# Patient Record
Sex: FEMALE | Race: WHITE | NOT HISPANIC OR LATINO | Employment: UNEMPLOYED | URBAN - METROPOLITAN AREA
[De-identification: names, ages, dates, MRNs, and addresses within clinical notes are randomized per-mention and may not be internally consistent; named-entity substitution may affect disease eponyms.]

---

## 2022-04-19 ENCOUNTER — TELEPHONE (OUTPATIENT)
Dept: UROLOGY | Facility: CLINIC | Age: 32
End: 2022-04-19
Payer: COMMERCIAL

## 2022-04-19 NOTE — TELEPHONE ENCOUNTER
[facility] Call Center    Phone Message    May a detailed message be left on voicemail: yes     Reason for Call: Other: Pt called and stated they would like a consult w/ provider for gender confirmation surgery, pt states they have a therapist and psychiatrist that are willing to sign off on this. Please call pt back to further discuss, Thanks!    Action Taken: Message routed to:  [facility] ([facility] Uro    Travel Screening: Not Applicable

## 2022-04-20 ENCOUNTER — TELEPHONE (OUTPATIENT)
Dept: PLASTIC SURGERY | Facility: CLINIC | Age: 32
End: 2022-04-20

## 2022-04-20 ENCOUNTER — OFFICE VISIT (OUTPATIENT)
Dept: DERMATOLOGY | Facility: CLINIC | Age: 32
End: 2022-04-20
Payer: COMMERCIAL

## 2022-04-20 DIAGNOSIS — F64.9 GENDER DYSPHORIA: Primary | ICD-10-CM

## 2022-04-20 PROBLEM — F41.9 ANXIETY: Status: ACTIVE | Noted: 2022-04-20

## 2022-04-20 PROBLEM — G47.33 MILD OBSTRUCTIVE SLEEP APNEA: Status: ACTIVE | Noted: 2021-05-06

## 2022-04-20 PROBLEM — F32.A DEPRESSION: Status: ACTIVE | Noted: 2022-04-20

## 2022-04-20 PROBLEM — F43.10 POSTTRAUMATIC STRESS DISORDER: Status: ACTIVE | Noted: 2019-12-03

## 2022-04-20 PROBLEM — J45.20 MILD INTERMITTENT ASTHMA WITHOUT COMPLICATION: Status: ACTIVE | Noted: 2019-05-20

## 2022-04-20 PROBLEM — F31.32 BIPOLAR AFFECTIVE DISORDER, CURRENTLY DEPRESSED, MODERATE (H): Status: ACTIVE | Noted: 2021-02-05

## 2022-04-20 PROBLEM — R45.851 SUICIDAL IDEATION: Status: ACTIVE | Noted: 2020-09-22

## 2022-04-20 PROCEDURE — 99214 OFFICE O/P EST MOD 30 MIN: CPT | Performed by: DERMATOLOGY

## 2022-04-20 RX ORDER — TEMAZEPAM 15 MG/1
15 CAPSULE ORAL
COMMUNITY
Start: 2021-09-15

## 2022-04-20 RX ORDER — MOMETASONE FUROATE 1 MG/G
CREAM TOPICAL
COMMUNITY
Start: 2022-03-07

## 2022-04-20 RX ORDER — TOPIRAMATE 50 MG/1
TABLET, FILM COATED ORAL
COMMUNITY
Start: 2022-03-29

## 2022-04-20 RX ORDER — LAMOTRIGINE 200 MG/1
TABLET ORAL
COMMUNITY
Start: 2022-03-27

## 2022-04-20 RX ORDER — FLUTICASONE PROPIONATE 50 MCG
2 SPRAY, SUSPENSION (ML) NASAL
COMMUNITY
Start: 2020-09-22

## 2022-04-20 RX ORDER — CLINDAMYCIN AND BENZOYL PEROXIDE 10; 50 MG/G; MG/G
GEL TOPICAL
COMMUNITY
Start: 2022-01-30

## 2022-04-20 RX ORDER — BUPROPION HYDROCHLORIDE 150 MG/1
300 TABLET ORAL DAILY
COMMUNITY
Start: 2021-10-05

## 2022-04-20 RX ORDER — QUETIAPINE FUMARATE 25 MG/1
TABLET, FILM COATED ORAL
COMMUNITY
Start: 2021-10-09

## 2022-04-20 RX ORDER — NEEDLES, DISPOSABLE 25GX5/8"
NEEDLE, DISPOSABLE MISCELLANEOUS
COMMUNITY
Start: 2022-04-15

## 2022-04-20 RX ORDER — ACETAMINOPHEN 160 MG
1 TABLET,DISINTEGRATING ORAL DAILY
COMMUNITY
Start: 2021-08-11

## 2022-04-20 RX ORDER — CETIRIZINE HYDROCHLORIDE 10 MG/1
TABLET ORAL
COMMUNITY
Start: 2022-04-19

## 2022-04-20 RX ORDER — PROGESTERONE 100 MG/1
100 CAPSULE ORAL DAILY
COMMUNITY
Start: 2022-02-25

## 2022-04-20 RX ORDER — TRETINOIN 0.5 MG/G
CREAM TOPICAL
COMMUNITY
Start: 2021-04-05

## 2022-04-20 RX ORDER — SPIRONOLACTONE 100 MG/1
100 TABLET, FILM COATED ORAL 2 TIMES DAILY
COMMUNITY
Start: 2022-02-18

## 2022-04-20 RX ORDER — SYRINGE, DISPOSABLE, 1 ML
SYRINGE, EMPTY DISPOSABLE MISCELLANEOUS
COMMUNITY
Start: 2022-04-15

## 2022-04-20 RX ORDER — ESTRADIOL CYPIONATE 5 MG/ML
INJECTION INTRAMUSCULAR
COMMUNITY
Start: 2022-03-20

## 2022-04-20 RX ORDER — ALPRAZOLAM 0.5 MG
TABLET ORAL
COMMUNITY
Start: 2022-02-04

## 2022-04-20 RX ORDER — ATOMOXETINE 40 MG/1
40 CAPSULE ORAL DAILY
COMMUNITY
Start: 2022-03-24

## 2022-04-20 RX ORDER — ALBUTEROL SULFATE 90 UG/1
AEROSOL, METERED RESPIRATORY (INHALATION)
COMMUNITY
Start: 2021-06-10

## 2022-04-20 RX ORDER — DESVENLAFAXINE 25 MG/1
TABLET, EXTENDED RELEASE ORAL
COMMUNITY
Start: 2022-04-08 | End: 2022-06-07

## 2022-04-20 NOTE — PROGRESS NOTES
[facility] Dermatology Note      Dermatology Problem List:  1.Acquired keratoderma  - urea  2. Verruca  3. Keratosis pilaris  4. Eczema     Encounter Date: Apr 20, 2022    CC:   Chief Complaint   Patient presents with     Derm Problem     Jocelynn is here for a consult for laser removal for face and genital area         History of Present Illness:  Ms. Jocelynn Carson is a 31 year old adult who {presents for evaluation of laser hair removal for face and genitals. The patient was referred by Lyly Chapa MD. The patient would like laser hair removal.     Dermatology Safety Transgender Patient Intake:  Is the patient pending upcoming gender affirmation surgery: No  IF yes, what type of surgery is the upcoming expected affirmation surgery(phalloplasty/vaginoplasty/facial surgery/NA)? N/A  What is the goal date of the upcoming expected affirmation surgery (date/ NA)? N/A  Is the patient on hormonal therapy(if yes list what type (estrogen/george/estradiol or testosoterone) and length of time treated):  Yes, estradiol, progesterone, spironolactone  Is the patient here for laser hair removal assessment:  Yes  Has the patient had laser hair removal in the past?  Yes, at home IPL hair removal  If, so, how many approximate weeks/months/years prior: More than 20 treatments      Has the patient ever had any other minimally invasive cosmetic procedures such as the following:  Electrolysis:  No  Botulinum toxin:  No  Fillers (list filler type and location):  No  Chemical peels: No  Lasers: No  Intense pulsed light: No  Broad band light: No  Microneedling: No  Has the patient every had any unknown injectable products or other injectable products? (If yes, please list details if available):  If any minimally invasive  cosmetic procedures performed prior to presentation to our clinic were they performed by a vicky DÍAZ/ NP/RN/ other or tyran:N/A    Has the patient had any prior surgeries as follows:  Breast  "augmentation:  No  Breast removal:  No  Genital:  No  Face:  No  Hair transplant:  No    Was insurance coverage pursued for laser hair removal procedure (Yes/no/pending)?  Does not know  Was insurance coverage obtained (yes/no/pending)?  Does not know    Does the patient have any other concerns regarding the following they would like to address at future appointments:  Skin tone/texture:  No  Rhytides:  No  Scars:  No  Hx of skin cancer:No  History of keloids?:No  History of hypertrophic scars?:No  Other:  No  Does patient have acne concerns: No      Other notes: History of eczema.         Past Medical History:   There is no problem list on file for this patient.  Gender dysphoria  Denies other medical issues  No past medical history on file.  No past surgical history on file.     Medications:  Current Outpatient Medications   Medication Sig Dispense Refill     albuterol (PROAIR HFA/PROVENTIL HFA/VENTOLIN HFA) 108 (90 Base) MCG/ACT inhaler INHALE 2 PUFFS BY MOUTH EVERY 4 HOURS AS NEEDED FOR WHEEZING       ALPRAZolam (XANAX) 0.5 MG tablet        atomoxetine (STRATTERA) 40 MG capsule Take 40 mg by mouth daily       B-D SYRINGE LUER-JESSI 1 ML MISC USE FOR WEEKLY ADMINISTRATIO OF MEDICATION       BD DISP NEEDLE 23G X 1\" MISC USE FOR WEEKLY ADMINISTRATION OF MEDICATION       BD DISP NEEDLES 18G X 1-1/2\" MISC USE FOR WEEKLY ADMINISTRATION OF MEDICATION       benzocaine (ORAJEL MAXIMUM STRENGTH) 20 % GEL gel        buPROPion (WELLBUTRIN XL) 150 MG 24 hr tablet Take 300 mg by mouth daily       cetirizine (ZYRTEC) 10 MG tablet        Cholecalciferol (VITAMIN D3) 50 MCG (2000 UT) CAPS Take 1 capsule by mouth daily       clindamycin-benzoyl peroxide (BENZACLIN) 1-5 % external gel APPLY TOPICALLY TO THE AFFECTED AREA DAILY       desvenlafaxine succinate (PRISTIQ) 25 MG 24 hr tablet        estradiol cypionate (DEPO-ESTRADIOL) 5 MG/ML injection ADMINISTER 0.8 ML IN THE MUSCLE 1 TIME A WEEK       fluticasone (FLONASE) 50 MCG/ACT " nasal spray 2 sprays       lamoTRIgine (LAMICTAL) 200 MG tablet        mometasone (ELOCON) 0.1 % external cream APPLY TWICE DAILY TO ITCHING FOOT. WRAP WITH SARAN WRAP AT NIGHT FOR 10 NIGHTS THEN JUST SUNDAY.       mometasone-formoterol (DULERA) 100-5 MCG/ACT inhaler Inhale 2 puffs into the lungs       progesterone (PROMETRIUM) 100 MG capsule Take 100 mg by mouth daily       QUEtiapine (SEROQUEL) 25 MG tablet TAKE ONE-HALF TO 1 TABLET BY MOUTH AT BEDTIME AS NEEDED FOR INSOMNIA WITH RUNNING THOUGHTS       sertraline (ZOLOFT) 50 MG tablet        spironolactone (ALDACTONE) 100 MG tablet Take 100 mg by mouth 2 times daily       temazepam (RESTORIL) 15 MG capsule Take 15 mg by mouth       topiramate (TOPAMAX) 50 MG tablet        tretinoin (RETIN-A) 0.05 % external cream Apply a thin layer topically once daily to areas of acne on skin.          No Known Allergies      Review of Systems:  -As per HPI  -Constitutional: Otherwise feeling well today, in usual state of health.  -HEENT: Patient denies nonhealing oral sores.  -Skin: As above in HPI. No additional skin concerns.    Physical exam:  Vitals: There were no vitals taken for this visit.  GEN: This is a well developed, well-nourished female in no acute distress, in a pleasant mood.      SKIN: Focused examination of the face and genitals was performed. Medical student acted as chaperone, Dio De La Fuente Dsouza skin type: II  - Terminal hairs on R jaw line, chin and neck  - Terminal hairs in genital area. No wounds or skin lesions.   -No other lesions of concern on areas examined.     Impression/Plan:  1. Gender dysphoria, pending vaginoplasty, plan for laser hair removal which is medically necessary    Reviewed each treatment will target existing hair bulbs, but to treat all hair multiple treatments will be needed. Patient informed laser hair removal is not permenant and hair may regrow years later. Reviewed this will not work for blond or white hair. Any blonde  or white hair will need electrolysis. Recommend the 755 laser Reviewed that home laser units may not be as efficacious.     Location: Neck, perioral, and face. Area planned for treatment and reviewed with patient is  or vaginoplasty(penis, scrotum, perineum  . The patient understands removing hair may result in visible scars      Dsouza skin type II.     We will initiate a letter to insurance to check for insurance coverage by  messaging the [employer].     We have confirmed the patient is not on minoxidil    Shaving hair prior to LHRwill improve efficacy. Any plucking, waxing, electrolysis, or needs to be avoided at least 4 weeks before LHR. Adhere to strict sun avoidance for a minimum of 6 weeks before and after each treatment.     We reviewed with the patient that even with laser hair removal there is still risk for hair growth within the neovaginal cavity for vaginoplasty and is medically recommended.  For phalloplasty, reviewed  hair growth within the neourethra resulting in trapping mostuire, skin products, build of of stones or urine flow for phalloplasty. This is medically necessary.         After treatment we recommend the patient wait 3 months after the last planned hair removal treatment before proceeding with surgery, in and effort to confirm that no further hair regrowth will occur.        Electrolysis was offered as an alternative. We would need to refer outside our sytem.         Other notes for laser hair removal team:   Patient would like to prioritize face/neck but also needs genital hair removal.         Notes from laser intake nursing checklist: Patient would like to prioritize face/neck but also needs genital hair removal.         The patient will send a [employer] message in 2 weeks to check status of coverage.     The patient would like a total skin exam scheduled.       CC Lyly Chapa MD  [address] [place] [address] on close of this encounter.    Follow-up in 2 weeks  by my chart. Also, see Dr. Tinsley for skin exam.        staffed the patient.    Staff Involved:  Scribe/Staff    Scribe Disclosure:  I, Winter Jeter, am serving as a scribe to document services personally performed by Brit Garcia MD based on data collection and the provider's statements to me.     Provider Disclosure:   The documentation recorded by the scribe accurately reflects the services I personally performed and the decisions made by me.    Brit Garcia MD    Department of Dermatology  [facility]: Phone: [phone], Fax:[phone]  [facility]: Phone: [phone], Fax: [phone]

## 2022-04-20 NOTE — TELEPHONE ENCOUNTER
Writer called pt regarding vaginoplasty consultation scheduling, discussed waitlist, added pt to waitlist.     Marisa Gamboa

## 2022-04-20 NOTE — Clinical Note
Haider schedule skin screening with Dr Tinsley in gender care slot. No for hair removal consult. Jose did this

## 2022-04-20 NOTE — LETTER
4/20/2022       RE: Jocelynn Carson  [address]  [address]  [place] [address]     Dear Colleague,    Thank you for referring your patient, Jocelynn Carson, to the [facility] [place] [facility]. Please see a copy of my visit note below.    [facility] Dermatology Note      Dermatology Problem List:  1.Acquired keratoderma  - urea  2. Verruca  3. Keratosis pilaris  4. Eczema     Encounter Date: Apr 20, 2022    CC:   Chief Complaint   Patient presents with     Derm Problem     Jocelynn is here for a consult for laser removal for face and genital area         History of Present Illness:  Ms. Jocelynn Carson is a 31 year old adult who {presents for evaluation of laser hair removal for face and genitals. The patient was referred by Lyly Chapa MD. The patient would like laser hair removal.     Dermatology Safety Transgender Patient Intake:  Is the patient pending upcoming gender affirmation surgery: No  IF yes, what type of surgery is the upcoming expected affirmation surgery(phalloplasty/vaginoplasty/facial surgery/NA)? N/A  What is the goal date of the upcoming expected affirmation surgery (date/ NA)? N/A  Is the patient on hormonal therapy(if yes list what type (estrogen/george/estradiol or testosoterone) and length of time treated):  Yes, estradiol, progesterone, spironolactone  Is the patient here for laser hair removal assessment:  Yes  Has the patient had laser hair removal in the past?  Yes, at home IPL hair removal  If, so, how many approximate weeks/months/years prior: More than 20 treatments      Has the patient ever had any other minimally invasive cosmetic procedures such as the following:  Electrolysis:  No  Botulinum toxin:  No  Fillers (list filler type and location):  No  Chemical peels: No  Lasers: No  Intense pulsed light: No  Broad band light: No  Microneedling: No  Has the patient every had any unknown  "injectable products or other injectable products? (If yes, please list details if available):  If any minimally invasive  cosmetic procedures performed prior to presentation to our clinic were they performed by a vicky DÍAZ/ NP/RN/ other or rory:N/A    Has the patient had any prior surgeries as follows:  Breast augmentation:  No  Breast removal:  No  Genital:  No  Face:  No  Hair transplant:  No    Was insurance coverage pursued for laser hair removal procedure (Yes/no/pending)?  Does not know  Was insurance coverage obtained (yes/no/pending)?  Does not know    Does the patient have any other concerns regarding the following they would like to address at future appointments:  Skin tone/texture:  No  Rhytides:  No  Scars:  No  Hx of skin cancer:No  History of keloids?:No  History of hypertrophic scars?:No  Other:  No  Does patient have acne concerns: No      Other notes: History of eczema.         Past Medical History:   There is no problem list on file for this patient.  Gender dysphoria  Denies other medical issues  No past medical history on file.  No past surgical history on file.     Medications:  Current Outpatient Medications   Medication Sig Dispense Refill     albuterol (PROAIR HFA/PROVENTIL HFA/VENTOLIN HFA) 108 (90 Base) MCG/ACT inhaler INHALE 2 PUFFS BY MOUTH EVERY 4 HOURS AS NEEDED FOR WHEEZING       ALPRAZolam (XANAX) 0.5 MG tablet        atomoxetine (STRATTERA) 40 MG capsule Take 40 mg by mouth daily       B-D SYRINGE LUER-JESSI 1 ML MISC USE FOR WEEKLY ADMINISTRATIO OF MEDICATION       BD DISP NEEDLE 23G X 1\" MISC USE FOR WEEKLY ADMINISTRATION OF MEDICATION       BD DISP NEEDLES 18G X 1-1/2\" MISC USE FOR WEEKLY ADMINISTRATION OF MEDICATION       benzocaine (ORAJEL MAXIMUM STRENGTH) 20 % GEL gel        buPROPion (WELLBUTRIN XL) 150 MG 24 hr tablet Take 300 mg by mouth daily       cetirizine (ZYRTEC) 10 MG tablet        Cholecalciferol (VITAMIN D3) 50 MCG (2000 UT) CAPS Take 1 capsule by mouth daily   "     clindamycin-benzoyl peroxide (BENZACLIN) 1-5 % external gel APPLY TOPICALLY TO THE AFFECTED AREA DAILY       desvenlafaxine succinate (PRISTIQ) 25 MG 24 hr tablet        estradiol cypionate (DEPO-ESTRADIOL) 5 MG/ML injection ADMINISTER 0.8 ML IN THE MUSCLE 1 TIME A WEEK       fluticasone (FLONASE) 50 MCG/ACT nasal spray 2 sprays       lamoTRIgine (LAMICTAL) 200 MG tablet        mometasone (ELOCON) 0.1 % external cream APPLY TWICE DAILY TO ITCHING FOOT. WRAP WITH SARAN WRAP AT NIGHT FOR 10 NIGHTS THEN JUST SUNDAY.       mometasone-formoterol (DULERA) 100-5 MCG/ACT inhaler Inhale 2 puffs into the lungs       progesterone (PROMETRIUM) 100 MG capsule Take 100 mg by mouth daily       QUEtiapine (SEROQUEL) 25 MG tablet TAKE ONE-HALF TO 1 TABLET BY MOUTH AT BEDTIME AS NEEDED FOR INSOMNIA WITH RUNNING THOUGHTS       sertraline (ZOLOFT) 50 MG tablet        spironolactone (ALDACTONE) 100 MG tablet Take 100 mg by mouth 2 times daily       temazepam (RESTORIL) 15 MG capsule Take 15 mg by mouth       topiramate (TOPAMAX) 50 MG tablet        tretinoin (RETIN-A) 0.05 % external cream Apply a thin layer topically once daily to areas of acne on skin.          No Known Allergies      Review of Systems:  -As per HPI  -Constitutional: Otherwise feeling well today, in usual state of health.  -HEENT: Patient denies nonhealing oral sores.  -Skin: As above in HPI. No additional skin concerns.    Physical exam:  Vitals: There were no vitals taken for this visit.  GEN: This is a well developed, well-nourished female in no acute distress, in a pleasant mood.      SKIN: Focused examination of the face and genitals was performed. Medical student acted as chaperone, Dio Ku  - Dsouza skin type: II  - Terminal hairs on R jaw line, chin and neck  - Terminal hairs in genital area. No wounds or skin lesions.   -No other lesions of concern on areas examined.     Impression/Plan:  1. Gender dysphoria, pending vaginoplasty, plan for  laser hair removal which is medically necessary    Reviewed each treatment will target existing hair bulbs, but to treat all hair multiple treatments will be needed. Patient informed laser hair removal is not permenant and hair may regrow years later. Reviewed this will not work for blond or white hair. Any blonde or white hair will need electrolysis. Recommend the 755 laser Reviewed that home laser units may not be as efficacious.     Location: Neck, perioral, and face. Area planned for treatment and reviewed with patient is  or vaginoplasty(penis, scrotum, perineum  . The patient understands removing hair may result in visible scars      Dsouza skin type II.     We will initiate a letter to insurance to check for insurance coverage by  messaging the [employer].     We have confirmed the patient is not on minoxidil    Shaving hair prior to LHRwill improve efficacy. Any plucking, waxing, electrolysis, or needs to be avoided at least 4 weeks before LHR. Adhere to strict sun avoidance for a minimum of 6 weeks before and after each treatment.     We reviewed with the patient that even with laser hair removal there is still risk for hair growth within the neovaginal cavity for vaginoplasty and is medically recommended.  For phalloplasty, reviewed  hair growth within the neourethra resulting in trapping mostuire, skin products, build of of stones or urine flow for phalloplasty. This is medically necessary.         After treatment we recommend the patient wait 3 months after the last planned hair removal treatment before proceeding with surgery, in and effort to confirm that no further hair regrowth will occur.        Electrolysis was offered as an alternative. We would need to refer outside our sytem.         Other notes for laser hair removal team:   Patient would like to prioritize face/neck but also needs genital hair removal.         Notes from laser intake nursing checklist: Patient would like to prioritize  face/neck but also needs genital hair removal.         The patient will send a [employer] message in 2 weeks to check status of coverage.     The patient would like a total skin exam scheduled.       CC Lyly Chapa MD  [address] [place] [address] on close of this encounter.    Follow-up in 2 weeks by my chart. Also, see Dr. Tinsley for skin exam.        staffed the patient.    Staff Involved:  Scribe/Staff    Scribe Disclosure:  I, Winter Jeter, am serving as a scribe to document services personally performed by Brit Garcia MD based on data collection and the provider's statements to me.     Provider Disclosure:   The documentation recorded by the scribe accurately reflects the services I personally performed and the decisions made by me.    Brit Garcia MD    Department of Dermatology  [facility]: Phone: [phone], Fax:[phone]  [facility]: Phone: [phone], Fax: [phone]        Dermatology Laser Intake Checklist:  History of psoriasis: No  History of recent tan, indoor or outdoor tanning/vacation or other sun exposure: No  History of vitiligo: No  Family history of vitiligo: No  Recent other cosmetic procedure(microderm abrasion/peel/hair removal/facial etc): No  History of HSV: Yes, rarely  Did the patient start valtrex: N/A  For genital laser hair removal patient only: Is there a history of genital warts or condyloma: No  Tattoo in the area to be treated: No  Is patient using hydroquinone: No  Retinoids and other acne medications stopped for 2 weeks: No  Has the patient had accutane in the last 6-12 months: No  Pregnant or breastfeeding: No  History of skin cancer in area planned for treatment: No  History of treatment with gold: No  Changes in medical history: N/A  Photos obtained: No  Does the patient smoke: No  Is the patient on  ibuprofen/aspirin/plavix/coumadin/other blood thinner: No  If patient is taking narcotic or diazepam(valium)-does patient have : No  There were no vitals taken for this visit.

## 2022-04-20 NOTE — PATIENT INSTRUCTIONS
[facility] Dermatology Visit    Thank you for allowing us to participate in your care. Your findings, instructions and follow-up plan are as follows:       Laser Hair Removal for Vaginoplasty    ? What is it?  Laser hair removal is a medical treatment that uses light to reduce hair. It works by targeting the  pigment (melanin) in darker hair.    ? Is it permanent?  No. Laser hair removal may not be permanent. Hair may regrow years later, but it tends to be  less, thinner, and paler than before treatment.    ? Why do I need to have this before my vaginoplasty?  Laser hair removal is medically recommended for vaginoplasties to prevent hair growth inside  the vagina after surgery.    ? What areas on my body will be treated?  We will remove hair in your pubic area, including hair over the penis, perineum and scrotum.  Please let your nurse and doctor know if you would like hair removed on the pubic bone or groin  creases. You will have scars after your surgery that may be visible in the treatment area.  Note: Removing hair around the anus (perianal) hair is for looks only (cosmetic) and costs  Extra.    ? Does it work on blonde or white hair?  No. Laser hair removal will not work for blonde or white hair. You will need electrolysis (a  treatment that uses radio waves to stop hair growth) for any blonde or white hair.    ? How many treatments do I need?  We suggest 6 to 12 treatments (performed every 3-6 weeks) before surgery. We also suggest  waiting 3 months after the last treatment to make sure you do not need further treatments.    ? What are the risks?  I will have redness, pain and swelling. I may have skin discoloration, bruising and itching. Risks  are permanent discoloration, hives, infection, scarring, eye injury, hair growth, and blisters. The  outcome could be no improvement or slight improvement. Multiple treatments are required. All  hair will not be removed.    ? Will my insurance pay  for this?  We will work with you to try to get insurance coverage. After your visit, we will send a letter to  your insurance through our financial counselors to check coverage. If you do NOT hear from us  in two weeks, please contact us via [place] or telephone. For more details on insurance  coverage, see the diagram below.    Insurance Workflow    Contact Information  ? [facility]  [address], [place] [address]  Phone Number: [phone]    ? [facility]  [address], 3rd floor, [place] [address]  Phone Number: [phone]    ? Transgender Intake & Referral Coordinator for insurance denial navigation:  Phone Number: [phone]    ? For urgent needs outside of business hours: Call [phone] and ask for the  dermatology resident on call.    ? What should I do BEFORE treatment?    6 weeks before treatment  ? Keep your treatment area out of the sun. Do NOT tan the removal area or come in for  treatment with a tan. Tans increase the risks of treatment. Do NOT use spray tan or any  over-the-counter self-tanners before treatment.  ? Stop waxing, plucking, electrolysis and other laser treatments.  ? Stop any other cosmetic treatments to the area, such as chemical peels or  Dermabrasion.    1 week before treatment  ? Do NOT use retinols, such as adapalene (Differin) and tretinoin (Retin-A), on the  treatment area.  ? Do NOT use any bleaching creams, such as hydroquinone, on the treatment area.    The night before treatment  ? Gently shave the treatment area.       When should I call my doctor?  If you are worsening or not improving, please, contact us or seek urgent care as noted below.     Who should I call with questions (adults)?  [facility] (adult and pediatric): [phone]  [facility] (adult): [phone]  For urgent needs outside of business hours call the  [facility] at [phone] and ask for the dermatology resident on call  If this is a medical emergency and you are unable to reach an ER, Call [phone]    Who should I call with questions (pediatric)?  [facility]- Pediatric Dermatology  Dr. Jodee Turner, Dr. Eusebio Ang, Dr. Sandie Guzman, Joan Zabala, PA  Dr. Rebecca Driver, Dr. Karen Berger & Dr. Ernesto Calderon  Non Urgent  Nurse Triage Line; [phone]- Naomi and Yashira NDIAYE Care Coordinators   [place] (/Complex ) [phone]    If you need a prescription refill, please contact your pharmacy. Refills are approved or denied by our physicians during normal business hours, Monday through Fridays  Per office policy, refills will not be granted if you have not been seen within the past year (or sooner depending on your child's condition).    Scheduling Information:  Pediatric Appointment Scheduling and Call Center [phone]  Radiology Scheduling- [phone]  Sedation Unit Scheduling- [phone]  [place] Scheduling- General [phone]; Pediatric Dermatology [phone]  Main  Services: [phone]  Upper sorbian: [phone]  Fijian: [phone]  Hmong/Kazakh/Parker: [phone]  Preadmission Nursing Department Fax Number: [phone] (fax all pre-operative paperwork to this number)    For urgent matters arising during evenings, weekends, or holidays that cannot wait for normal business hours please call [phone] and ask for the dermatology resident on call to be paged.

## 2022-04-20 NOTE — PROGRESS NOTES
Dermatology Laser Intake Checklist:  History of psoriasis: No  History of recent tan, indoor or outdoor tanning/vacation or other sun exposure: No  History of vitiligo: No  Family history of vitiligo: No  Recent other cosmetic procedure(microderm abrasion/peel/hair removal/facial etc): No  History of HSV: Yes, rarely  Did the patient start valtrex: N/A  For genital laser hair removal patient only: Is there a history of genital warts or condyloma: No  Tattoo in the area to be treated: No  Is patient using hydroquinone: No  Retinoids and other acne medications stopped for 2 weeks: No  Has the patient had accutane in the last 6-12 months: No  Pregnant or breastfeeding: No  History of skin cancer in area planned for treatment: No  History of treatment with gold: No  Changes in medical history: N/A  Photos obtained: No  Does the patient smoke: No  Is the patient on ibuprofen/aspirin/plavix/coumadin/other blood thinner: No  If patient is taking narcotic or diazepam(valium)-does patient have : No  There were no vitals taken for this visit.

## 2022-05-29 ENCOUNTER — HEALTH MAINTENANCE LETTER (OUTPATIENT)
Age: 32
End: 2022-05-29

## 2022-06-01 ENCOUNTER — E-VISIT (OUTPATIENT)
Dept: URGENT CARE | Facility: URGENT CARE | Age: 32
End: 2022-06-01
Payer: COMMERCIAL

## 2022-06-01 DIAGNOSIS — L98.9 SKIN LESION: Primary | ICD-10-CM

## 2022-06-01 PROCEDURE — 99421 OL DIG E/M SVC 5-10 MIN: CPT | Performed by: NURSE PRACTITIONER

## 2022-06-01 RX ORDER — TRIAMCINOLONE ACETONIDE 1 MG/G
CREAM TOPICAL 2 TIMES DAILY
Qty: 15 G | Refills: 0 | Status: SHIPPED | OUTPATIENT
Start: 2022-06-01 | End: 2022-06-08

## 2022-06-01 NOTE — PATIENT INSTRUCTIONS
Dear Jocelynn Carson,    It looks like this lesion still has some inflammation to it so I am giving you a prescription for topical steroids that you can use twice a day for 7 days.  Do not use these longer than 7 days as they may start to cause your skin to have changes that are not positive.    If this is not helpful it may just take time for this to settle down and go away.  Sometimes scars like this can take months and months to become clear or whitish in color.  You may wish to consult with a dermatologist for further treatment.    Stephanie Meredith, CNP

## 2022-06-08 ENCOUNTER — APPOINTMENT (OUTPATIENT)
Dept: URGENT CARE | Facility: CLINIC | Age: 32
End: 2022-06-08
Payer: COMMERCIAL

## 2022-10-02 ENCOUNTER — MYC MEDICAL ADVICE (OUTPATIENT)
Dept: DERMATOLOGY | Facility: CLINIC | Age: 32
End: 2022-10-02

## 2022-10-03 ENCOUNTER — HEALTH MAINTENANCE LETTER (OUTPATIENT)
Age: 32
End: 2022-10-03

## 2022-11-01 ENCOUNTER — MYC MEDICAL ADVICE (OUTPATIENT)
Dept: PLASTIC SURGERY | Facility: CLINIC | Age: 32
End: 2022-11-01

## 2022-11-01 DIAGNOSIS — F64.0 GENDER DYSPHORIA IN ADULT: Primary | ICD-10-CM

## 2022-11-01 NOTE — TELEPHONE ENCOUNTER
[facility] :  Care Coordination Note     SITUATION   Jocelynn Carson (she/her) is a 32 year old adult who is receiving support for:  Care Team  .    BACKGROUND     Pt is scheduled for a vaginoplasty consult with Dr. Higginbotham on 1/10/23.     ASSESSMENT     Surgery              CGC Assessment  Comprehensive Gender Care (CGC) Enrollment: (P) Enrolled  Patient has a therapist: (P) Yes  Name of therapist: (P) Sabrina Khan  Letter of support #1: (P) Requested  Letter of support #2: (P) Requested  Surgery being considered: (P) Yes  Vaginoplasty: (P) Yes    Pt reports:   No smoking  2 years HRT      PLAN          Nursing Interventions:  @FLOW([id number]::1:2)@    Follow-up plan:    1. Obtain TESSIE Healy

## 2022-11-01 NOTE — TELEPHONE ENCOUNTER
Writer ADRIEN re: scheduling consult. Pt is at the top of Dr. Higginbotham's waitlist for vaginoplasty consult.

## 2022-11-02 NOTE — TELEPHONE ENCOUNTER
FUTURE VISIT INFORMATION      FUTURE VISIT INFORMATION:    Date: 1/10/22    Time: 10:00am    [place] [facility] [place]ERRAL INFORMATION:    Reason for visit/diagnosis  vaginoplasty    RECORDS REQUESTED FROM:       No recs to collect

## 2023-01-10 ENCOUNTER — OFFICE VISIT (OUTPATIENT)
Dept: PLASTIC SURGERY | Facility: CLINIC | Age: 33
End: 2023-01-10
Payer: COMMERCIAL

## 2023-01-10 ENCOUNTER — PRE VISIT (OUTPATIENT)
Dept: UROLOGY | Facility: CLINIC | Age: 33
End: 2023-01-10

## 2023-01-10 VITALS
OXYGEN SATURATION: 97 % | BODY MASS INDEX: 39.1 KG/M2 | SYSTOLIC BLOOD PRESSURE: 137 MMHG | DIASTOLIC BLOOD PRESSURE: 84 MMHG | HEART RATE: 86 BPM | HEIGHT: 71 IN | WEIGHT: 279.3 LBS

## 2023-01-10 DIAGNOSIS — F64.0 GENDER DYSPHORIA IN ADULT: ICD-10-CM

## 2023-01-10 PROCEDURE — 99205 OFFICE O/P NEW HI 60 MIN: CPT | Performed by: UROLOGY

## 2023-01-10 ASSESSMENT — PAIN SCALES - GENERAL: PAINLEVEL: NO PAIN (0)

## 2023-01-10 NOTE — PROGRESS NOTES
"[facility] Consult H&P    Name: Jocelynn Carson    MRN: [id number]   YOB: 1990                 Chief Complaint:   Gender Dysphoria          History of Present Illness:   Jocelynn Carson is a 32 year old transgender female seen in consultation for gender dysphoria    Patient transitioned 2 years ago and has been on hormones since  Preferred pronouns are: she/her  The patient has been on exogenous hormones since 2 years, progesterone and george   In terms of an intimate relationship, the patient has a romantic relationship with a male partner.  In terms of fertility, the patient: is not interested    Has not had prior surgeries  nonsmoker    Is interested in vaginoplasty            Past Medical History:   None reported         Past Surgical History:   none         Social History:     Social History     Tobacco Use     Smoking status: Never     Smokeless tobacco: Never   Substance Use Topics     Alcohol use: Not on file            Family History:   No family history on file.           Allergies:     Allergies   Allergen Reactions     Mold Dizziness, Fatigue, Headache, Itching, Rash and Shortness Of Breath     Molds & Smuts Cough and Headache     Other reaction(s): Respiratory Distress     No Clinical Screening - See Comments Cough, Difficulty breathing, Itching, Other (See Comments) and Shortness Of Breath            Medications:     Current Outpatient Medications   Medication Sig     albuterol (PROAIR HFA/PROVENTIL HFA/VENTOLIN HFA) 108 (90 Base) MCG/ACT inhaler INHALE 2 PUFFS BY MOUTH EVERY 4 HOURS AS NEEDED FOR WHEEZING     ALPRAZolam (XANAX) 0.5 MG tablet      atomoxetine (STRATTERA) 40 MG capsule Take 40 mg by mouth daily     B-D SYRINGE LUER-JESSI 1 ML MISC USE FOR WEEKLY ADMINISTRATIO OF MEDICATION     BD DISP NEEDLE 23G X 1\" MISC USE FOR WEEKLY ADMINISTRATION OF MEDICATION     BD DISP NEEDLES 18G X 1-1/2\" MISC USE FOR WEEKLY ADMINISTRATION OF MEDICATION     benzocaine (ORAJEL " "MAXIMUM STRENGTH) 20 % GEL gel      buPROPion (WELLBUTRIN XL) 150 MG 24 hr tablet Take 300 mg by mouth daily     cetirizine (ZYRTEC) 10 MG tablet      Cholecalciferol (VITAMIN D3) 50 MCG (2000 UT) CAPS Take 1 capsule by mouth daily     clindamycin-benzoyl peroxide (BENZACLIN) 1-5 % external gel APPLY TOPICALLY TO THE AFFECTED AREA DAILY     desvenlafaxine succinate (PRISTIQ) 25 MG 24 hr tablet      estradiol cypionate (DEPO-ESTRADIOL) 5 MG/ML injection ADMINISTER 0.8 ML IN THE MUSCLE 1 TIME A WEEK     fluticasone (FLONASE) 50 MCG/ACT nasal spray 2 sprays     lamoTRIgine (LAMICTAL) 200 MG tablet      mometasone (ELOCON) 0.1 % external cream APPLY TWICE DAILY TO ITCHING FOOT. WRAP WITH SARAN WRAP AT NIGHT FOR 10 NIGHTS THEN JUST SUNDAY.     mometasone-formoterol (DULERA) 100-5 MCG/ACT inhaler Inhale 2 puffs into the lungs     progesterone (PROMETRIUM) 100 MG capsule Take 100 mg by mouth daily     QUEtiapine (SEROQUEL) 25 MG tablet TAKE ONE-HALF TO 1 TABLET BY MOUTH AT BEDTIME AS NEEDED FOR INSOMNIA WITH RUNNING THOUGHTS     sertraline (ZOLOFT) 50 MG tablet      spironolactone (ALDACTONE) 100 MG tablet Take 100 mg by mouth 2 times daily     temazepam (RESTORIL) 15 MG capsule Take 15 mg by mouth     topiramate (TOPAMAX) 50 MG tablet      tretinoin (RETIN-A) 0.05 % external cream Apply a thin layer topically once daily to areas of acne on skin.     No current facility-administered medications for this visit.               Physical Exam:   /84   Pulse 86   Ht 1.803 m (5' 11\")   Wt 126.7 kg (279 lb 4.8 oz)   SpO2 97%   BMI 38.95 kg/m    GENERAL: Healthy, alert and no distress  EYES: Eyes grossly normal to inspection.  No discharge or erythema  RESP: No audible wheeze, cough, or visible cyanosis.  NEURO: Alert and oriented x3  PSYCH: Mentation appears normal, judgement and insight intact, normal speech            Assessment and Plan:   32 year old transgender female with gender dysphoria    The criteria for " genital surgery are specific to the type of surgery being requested.  Criteria for bottom surgery:    1. Persistent, well documented gender dysphoria;  2. Capacity to make a fully informed decision and to consent for treatment;  3. Age of consent (>18 years old)  4. If significant medical or mental health concerns are present, they must be well controlled.  5. 12 continuous months of hormone therapy as appropriate to the patient s gender goals (unless  the patient has a medical contraindication or is otherwise unable or unwilling to take  Hormones).  6. Two letters of support    The aim of hormone therapy prior to gonadectomy is primarily to introduce a period of reversible  estrogen or testosterone suppression, before the patient undergoes irreversible surgical intervention.    I reviewed the steps of orchiectomy. I reviewed the surgical procedure. I reviewed the risks and benefits including bleeding, infection and irreversible nature of the procedure.     Hair removal is a requirement prior to full depth vaginoplasty as the genital skin will be placed in the vaginal cavity. Lack of hair removal would lead to complications related to intravaginal hair. This is nearly impossible to remove postoperatively.    She has a persistent, well documented gender dysphoria. She has capacity to make a fully informed decision and to consent for treatment. Her mental health issues are well controlled. She has been on continuous hormones for years. She has 0 letters of support.     The patient meets all of these criteria. We discussed that gender affirmation surgery should be considered permanent. We discussed risks/complications of rectal injury, rectovaginal fistula, bleeding, fluid collection, infection, injury to surrounding structures, flap loss, sensory loss, wound dehiscence, vaginal prolapse, vaginal shrinkage/stenosis, need for lifelong dilation, urinary stream abnormalities, DVT/PE and need for revision surgery.     We  discussed the option for minimal depth and full depth. She would like full depth. We discussed that we do not perform full depth vaginoplasties when BMI >35. Her current BMI is 39 so she would need to lose weight to be a candidate for full depth      We also discussed the need to stop hormones porfirio-procedurally for 2 weeks before and after surgery.     We discussed that transgender vaginoplasty for this patient would include: penectomy, orchiectomy, clitoroplasty, labiaplasty, urethral reconstruction, creation of a vagina, skin graft, colpopexy to suspend the vagina, and scrotectomy.         Plan:  Needs 2 letters  Is starting electrolysis today   Discussed she would need to be BMI ~35 to be a candidate for full depth vaginoplasty     F/U: when nearing completion of hair removal     Physician Attestation   I, Lencho Higginbotham MD, saw this patient and agree with the findings and plan of care as documented in the note.      Lencho Higginbotham MD   Reconstructive Urology  [facility]      60 minutes spent on the date of the encounter doing chart review, history and exam, documentation and further activities per the note

## 2023-01-10 NOTE — NURSING NOTE
"Chief Complaint   Patient presents with     Consult     vaginoplasty       Vitals:    01/10/23 1211   BP: 137/84   Pulse: 86   SpO2: 97%   Weight: 126.7 kg (279 lb 4.8 oz)   Height: 1.803 m (5' 11\")       Body mass index is 38.95 kg/m .          KERRY WADE EMT     "

## 2023-01-10 NOTE — LETTER
1/10/2023       RE: Jocelynn Carson  [address]  [address]  [place] [address]     Dear Colleague,    Thank you for referring your patient, Jocelynn Carson, to the [facility] PLASTIC [facility] RECON[facility]ERY [facility]IN[facility] [place] [facility]. Please see a copy of my visit note below.    [facility] Consult H&P    Name: Jocelynn Carson    MRN: [id number]   YOB: 1990                 Chief Complaint:   Gender Dysphoria          History of Present Illness:   Jocelynn Carson is a 32 year old transgender female seen in consultation for gender dysphoria    Patient transitioned 2 years ago and has been on hormones since  Preferred pronouns are: she/her  The patient has been on exogenous hormones since 2 years, progesterone and george   In terms of an intimate relationship, the patient has a romantic relationship with a male partner.  In terms of fertility, the patient: is not interested    Has not had prior surgeries  nonsmoker    Is interested in vaginoplasty            Past Medical History:   None reported         Past Surgical History:   none         Social History:     Social History     Tobacco Use     Smoking status: Never     Smokeless tobacco: Never   Substance Use Topics     Alcohol use: Not on file            Family History:   No family history on file.           Allergies:     Allergies   Allergen Reactions     Mold Dizziness, Fatigue, Headache, Itching, Rash and Shortness Of Breath     Molds & Smuts Cough and Headache     Other reaction(s): Respiratory Distress     No Clinical Screening - See Comments Cough, Difficulty breathing, Itching, Other (See Comments) and Shortness Of Breath            Medications:     Current Outpatient Medications   Medication Sig     albuterol (PROAIR HFA/PROVENTIL HFA/VENTOLIN HFA) 108 (90 Base) MCG/ACT inhaler INHALE 2 PUFFS BY MOUTH EVERY 4 HOURS AS NEEDED FOR WHEEZING     ALPRAZolam (XANAX) 0.5  "MG tablet      atomoxetine (STRATTERA) 40 MG capsule Take 40 mg by mouth daily     B-D SYRINGE LUER-JESSI 1 ML MISC USE FOR WEEKLY ADMINISTRATIO OF MEDICATION     BD DISP NEEDLE 23G X 1\" MISC USE FOR WEEKLY ADMINISTRATION OF MEDICATION     BD DISP NEEDLES 18G X 1-1/2\" MISC USE FOR WEEKLY ADMINISTRATION OF MEDICATION     benzocaine (ORAJEL MAXIMUM STRENGTH) 20 % GEL gel      buPROPion (WELLBUTRIN XL) 150 MG 24 hr tablet Take 300 mg by mouth daily     cetirizine (ZYRTEC) 10 MG tablet      Cholecalciferol (VITAMIN D3) 50 MCG (2000 UT) CAPS Take 1 capsule by mouth daily     clindamycin-benzoyl peroxide (BENZACLIN) 1-5 % external gel APPLY TOPICALLY TO THE AFFECTED AREA DAILY     desvenlafaxine succinate (PRISTIQ) 25 MG 24 hr tablet      estradiol cypionate (DEPO-ESTRADIOL) 5 MG/ML injection ADMINISTER 0.8 ML IN THE MUSCLE 1 TIME A WEEK     fluticasone (FLONASE) 50 MCG/ACT nasal spray 2 sprays     lamoTRIgine (LAMICTAL) 200 MG tablet      mometasone (ELOCON) 0.1 % external cream APPLY TWICE DAILY TO ITCHING FOOT. WRAP WITH SARAN WRAP AT NIGHT FOR 10 NIGHTS THEN JUST SUNDAY.     mometasone-formoterol (DULERA) 100-5 MCG/ACT inhaler Inhale 2 puffs into the lungs     progesterone (PROMETRIUM) 100 MG capsule Take 100 mg by mouth daily     QUEtiapine (SEROQUEL) 25 MG tablet TAKE ONE-HALF TO 1 TABLET BY MOUTH AT BEDTIME AS NEEDED FOR INSOMNIA WITH RUNNING THOUGHTS     sertraline (ZOLOFT) 50 MG tablet      spironolactone (ALDACTONE) 100 MG tablet Take 100 mg by mouth 2 times daily     temazepam (RESTORIL) 15 MG capsule Take 15 mg by mouth     topiramate (TOPAMAX) 50 MG tablet      tretinoin (RETIN-A) 0.05 % external cream Apply a thin layer topically once daily to areas of acne on skin.     No current facility-administered medications for this visit.               Physical Exam:   /84   Pulse 86   Ht 1.803 m (5' 11\")   Wt 126.7 kg (279 lb 4.8 oz)   SpO2 97%   BMI 38.95 kg/m    GENERAL: Healthy, alert and no " distress  EYES: Eyes grossly normal to inspection.  No discharge or erythema  RESP: No audible wheeze, cough, or visible cyanosis.  NEURO: Alert and oriented x3  PSYCH: Mentation appears normal, judgement and insight intact, normal speech            Assessment and Plan:   32 year old transgender female with gender dysphoria    The criteria for genital surgery are specific to the type of surgery being requested.  Criteria for bottom surgery:    1. Persistent, well documented gender dysphoria;  2. Capacity to make a fully informed decision and to consent for treatment;  3. Age of consent (>18 years old)  4. If significant medical or mental health concerns are present, they must be well controlled.  5. 12 continuous months of hormone therapy as appropriate to the patient s gender goals (unless  the patient has a medical contraindication or is otherwise unable or unwilling to take  Hormones).  6. Two letters of support    The aim of hormone therapy prior to gonadectomy is primarily to introduce a period of reversible  estrogen or testosterone suppression, before the patient undergoes irreversible surgical intervention.    I reviewed the steps of orchiectomy. I reviewed the surgical procedure. I reviewed the risks and benefits including bleeding, infection and irreversible nature of the procedure.     Hair removal is a requirement prior to full depth vaginoplasty as the genital skin will be placed in the vaginal cavity. Lack of hair removal would lead to complications related to intravaginal hair. This is nearly impossible to remove postoperatively.    She has a persistent, well documented gender dysphoria. She has capacity to make a fully informed decision and to consent for treatment. Her mental health issues are well controlled. She has been on continuous hormones for years. She has 0 letters of support.     The patient meets all of these criteria. We discussed that gender affirmation surgery should be considered  permanent. We discussed risks/complications of rectal injury, rectovaginal fistula, bleeding, fluid collection, infection, injury to surrounding structures, flap loss, sensory loss, wound dehiscence, vaginal prolapse, vaginal shrinkage/stenosis, need for lifelong dilation, urinary stream abnormalities, DVT/PE and need for revision surgery.     We discussed the option for minimal depth and full depth. She would like full depth. We discussed that we do not perform full depth vaginoplasties when BMI >35. Her current BMI is 39 so she would need to lose weight to be a candidate for full depth      We also discussed the need to stop hormones porfirio-procedurally for 2 weeks before and after surgery.     We discussed that transgender vaginoplasty for this patient would include: penectomy, orchiectomy, clitoroplasty, labiaplasty, urethral reconstruction, creation of a vagina, skin graft, colpopexy to suspend the vagina, and scrotectomy.         Plan:  Needs 2 letters  Is starting electrolysis today   Discussed she would need to be BMI ~35 to be a candidate for full depth vaginoplasty     F/U: when nearing completion of hair removal     Physician Attestation   I, Lencho Higginbotham MD, saw this patient and agree with the findings and plan of care as documented in the note.      Lencho Higginbotham MD   Reconstructive Urology  [facility]      60 minutes spent on the date of the encounter doing chart review, history and exam, documentation and further activities per the note

## 2023-12-31 ENCOUNTER — HEALTH MAINTENANCE LETTER (OUTPATIENT)
Age: 33
End: 2023-12-31

## 2025-01-19 ENCOUNTER — HEALTH MAINTENANCE LETTER (OUTPATIENT)
Age: 35
End: 2025-01-19

## 2025-07-24 NOTE — PROGRESS NOTES
"SUBJECTIVE:  Jocelynn is a 34 year old with gender dysphoria who is seeking gender affirming vaginoplasty. She had an initial consult with Dr Higginbotham on 01/10/2023 and is here today for return consult and hair check.   She is eager to have full depth vaginoplasty.  She would like a canal.  She has considerable dysphoria related to genitals being misaligned to gender identity.  She did hair removal at [facility].  She did lose some weight. She was taking GLP1 inhibitor but has now stopped. She wishes to get back on this.    Med Hx: Asthma  Obesity  Complete for   Surg Hx: None  Nicotine use: No    OBJECTIVE:  /83 (BP Location: Left arm, Patient Position: Sitting, Cuff Size: Adult Large)   Pulse 93   Ht 1.803 m (5' 11\")   Wt 123.5 kg (272 lb 3.2 oz)   SpO2 97%   BMI 37.96 kg/m    Circumcised phallus  Scrotum hairless with testicles in place.    ASSESSMENT/PLAN:  Gender dysphoria - seeking full depth vaginoplasty  She has a persistent, well documented gender dysphoria. She has capacity to make a fully informed decision and to consent for treatment. Her mental health issues are well controlled. She has been on continuous hormones for years. She is working on letters of support.     The patient meets all of these criteria. We discussed that gender affirmation surgery should be considered permanent. We discussed risks/complications of rectal injury, rectovaginal fistula, bleeding, fluid collection, infection, injury to surrounding structures, flap loss, sensory loss, wound dehiscence, vaginal prolapse, vaginal shrinkage/stenosis, need for lifelong dilation, urinary stream abnormalities, DVT/PE and need for revision surgery.     We discussed the option for minimal depth. She would like full depth.     We also discussed the need to stop hormones porfirio-procedurally for 1 weeks before and after surgery.     We discussed that transgender vaginoplasty for this patient would include: penectomy, orchiectomy, " clitoroplasty, labiaplasty, urethral reconstruction, creation of a vagina, skin graft, colpopexy to suspend the vagina, and scrotectomy.     Would need prior auth after letters in place.  Patient will require 5 day hospital stay for this procedure.  Would need to hold GLP1 for a week if she goes back on GLP1 preoperatively.    KRISTIE Camacho, CNP    Physician Attestation   I, Lencho Higginbotham MD, saw this patient and agree with the findings and plan of care as documented in the note.      Lencho Higginbotham MD  Reconstructive Urology    30 minutes spent by me on the date of the encounter doing chart review, history and exam, documentation and further activities per the note

## 2025-07-29 ENCOUNTER — OFFICE VISIT (OUTPATIENT)
Dept: PLASTIC SURGERY | Facility: CLINIC | Age: 35
End: 2025-07-29
Payer: COMMERCIAL

## 2025-07-29 VITALS
DIASTOLIC BLOOD PRESSURE: 83 MMHG | OXYGEN SATURATION: 97 % | WEIGHT: 272.2 LBS | BODY MASS INDEX: 38.11 KG/M2 | HEART RATE: 93 BPM | HEIGHT: 71 IN | SYSTOLIC BLOOD PRESSURE: 117 MMHG

## 2025-07-29 DIAGNOSIS — F64.9 GENDER DYSPHORIA: Primary | ICD-10-CM

## 2025-07-29 PROCEDURE — 99214 OFFICE O/P EST MOD 30 MIN: CPT | Mod: KX | Performed by: UROLOGY

## 2025-07-29 RX ORDER — LORAZEPAM 0.5 MG/1
0.5 TABLET ORAL DAILY PRN
COMMUNITY
Start: 2025-07-15

## 2025-07-29 RX ORDER — PRAZOSIN HYDROCHLORIDE 2 MG/1
2 CAPSULE ORAL
COMMUNITY
Start: 2025-07-15

## 2025-07-29 RX ORDER — LEVALBUTEROL TARTRATE 45 UG/1
1-2 AEROSOL, METERED ORAL
COMMUNITY
Start: 2025-05-29

## 2025-07-29 ASSESSMENT — PAIN SCALES - GENERAL: PAINLEVEL_OUTOF10: NO PAIN (0)

## 2025-07-29 ASSESSMENT — PATIENT HEALTH QUESTIONNAIRE - PHQ9
10. IF YOU CHECKED OFF ANY PROBLEMS, HOW DIFFICULT HAVE THESE PROBLEMS MADE IT FOR YOU TO DO YOUR WORK, TAKE CARE OF THINGS AT HOME, OR GET ALONG WITH OTHER PEOPLE: EXTREMELY DIFFICULT
SUM OF ALL RESPONSES TO PHQ QUESTIONS 1-9: 25
SUM OF ALL RESPONSES TO PHQ QUESTIONS 1-9: 25

## 2025-07-29 NOTE — LETTER
"7/29/2025       RE: Jocelynn Carson  [id number] [facility] [id number]     Dear Colleague,    Thank you for referring your patient, Jocelynn Carson, to the [facility] [place] at [facility]. Please see a copy of my visit note below.    SUBJECTIVE:  Jocelynn is a 34 year old with gender dysphoria who is seeking gender affirming vaginoplasty. She had an initial consult with Dr Higginbotham on 01/10/2023 and is here today for return consult and hair check.   She is eager to have full depth vaginoplasty.  She would like a canal.  She has considerable dysphoria related to genitals being misaligned to gender identity.  She did hair removal at [facility].  She did lose some weight. She was taking GLP1 inhibitor but has now stopped. She wishes to get back on this.    Med Hx: Asthma  Obesity  Complete for   Surg Hx: None  Nicotine use: No    OBJECTIVE:  /83 (BP Location: Left arm, Patient Position: Sitting, Cuff Size: Adult Large)   Pulse 93   Ht 1.803 m (5' 11\")   Wt 123.5 kg (272 lb 3.2 oz)   SpO2 97%   BMI 37.96 kg/m    Circumcised phallus  Scrotum hairless with testicles in place.    ASSESSMENT/PLAN:  Gender dysphoria - seeking full depth vaginoplasty  She has a persistent, well documented gender dysphoria. She has capacity to make a fully informed decision and to consent for treatment. Her mental health issues are well controlled. She has been on continuous hormones for years. She is working on letters of support.     The patient meets all of these criteria. We discussed that gender affirmation surgery should be considered permanent. We discussed risks/complications of rectal injury, rectovaginal fistula, bleeding, fluid collection, infection, injury to surrounding structures, flap loss, sensory loss, wound dehiscence, vaginal prolapse, vaginal shrinkage/stenosis, need for lifelong dilation, urinary stream " abnormalities, DVT/PE and need for revision surgery.     We discussed the option for minimal depth. She would like full depth.     We also discussed the need to stop hormones porfirio-procedurally for 1 weeks before and after surgery.     We discussed that transgender vaginoplasty for this patient would include: penectomy, orchiectomy, clitoroplasty, labiaplasty, urethral reconstruction, creation of a vagina, skin graft, colpopexy to suspend the vagina, and scrotectomy.     Would need prior auth after letters in place.  Patient will require 5 day hospital stay for this procedure.  Would need to hold GLP1 for a week if she goes back on GLP1 preoperatively.    KRISTIE Camacho, CNP    Physician Attestation  I, Lencho Higginbotham MD, saw this patient and agree with the findings and plan of care as documented in the note.      Lencho Higginbotham MD  Reconstructive Urology    30 minutes spent by me on the date of the encounter doing chart review, history and exam, documentation and further activities per the note    Again, thank you for allowing me to participate in the care of your patient.      Sincerely,    Lencho Higginbotham MD

## 2025-07-29 NOTE — NURSING NOTE
"Chief Complaint   Patient presents with    RECHECK     Hair check.       Vitals:    07/29/25 1202   BP: 117/83   BP Location: Left arm   Patient Position: Sitting   Cuff Size: Adult Large   Pulse: 93   SpO2: 97%   Weight: 272 lb 3.2 oz   Height: 5' 11\"       Body mass index is 37.96 kg/m .        Lowell Velazquez EMT    "

## 2025-07-29 NOTE — NURSING NOTE
I met with Jocelynn Carson to discuss her possible interest in joining the [employer] study. I reviewed the consent and answered all study related questions. Jocelynn Carson opted to join the study and signed the consent. A copy of the consent was given to her for her records.      Consent obtained by: Angy Hernandez CMA       Consent Date: 07/29/25   HIPAA authorization signed?: Yes    Consent sent to scanning - NA - e consent  Patient entered into [employer] - Yes  Patient entered into [employer] - Yes    Angy Hernandez CMA  July 29, 2025

## 2025-09-03 ENCOUNTER — PATIENT OUTREACH (OUTPATIENT)
Dept: PLASTIC SURGERY | Facility: CLINIC | Age: 35
End: 2025-09-03
Payer: COMMERCIAL